# Patient Record
Sex: FEMALE | Race: WHITE | Employment: FULL TIME | URBAN - NONMETROPOLITAN AREA
[De-identification: names, ages, dates, MRNs, and addresses within clinical notes are randomized per-mention and may not be internally consistent; named-entity substitution may affect disease eponyms.]

---

## 2021-09-21 ENCOUNTER — HOSPITAL ENCOUNTER (EMERGENCY)
Age: 41
Discharge: HOME | End: 2021-09-21
Attending: FAMILY MEDICINE

## 2021-09-21 VITALS
OXYGEN SATURATION: 100 % | WEIGHT: 179 LBS | BODY MASS INDEX: 28.09 KG/M2 | TEMPERATURE: 97.8 F | HEART RATE: 79 BPM | RESPIRATION RATE: 18 BRPM | HEIGHT: 67 IN | SYSTOLIC BLOOD PRESSURE: 161 MMHG | DIASTOLIC BLOOD PRESSURE: 78 MMHG

## 2021-09-21 DIAGNOSIS — N30.00 ACUTE CYSTITIS WITHOUT HEMATURIA: Primary | ICD-10-CM

## 2021-09-21 LAB
APPEARANCE UR: CLEAR
BACTERIA URNS QL MICRO: ABNORMAL /HPF
BILIRUB UR QL: NEGATIVE
COLOR UR: ABNORMAL
EPITH CASTS URNS QL MICRO: ABNORMAL /LPF (ref 0–20)
GLUCOSE UR STRIP.AUTO-MCNC: NEGATIVE MG/DL
HGB UR QL STRIP: ABNORMAL
KETONES UR QL STRIP.AUTO: ABNORMAL MG/DL
LEUKOCYTE ESTERASE UR QL STRIP.AUTO: ABNORMAL
NITRITE UR QL STRIP.AUTO: POSITIVE
PH UR STRIP: 6 [PH] (ref 5–8)
PROT UR STRIP-MCNC: NEGATIVE MG/DL
RBC #/AREA URNS HPF: ABNORMAL /HPF (ref 0–2)
SP GR UR REFRACTOMETRY: <1.005 (ref 1–1.03)
UROBILINOGEN UR QL STRIP.AUTO: 1 EU/DL (ref 0.2–1)
WBC URNS QL MICRO: ABNORMAL /HPF (ref 0–4)

## 2021-09-21 PROCEDURE — 81001 URINALYSIS AUTO W/SCOPE: CPT

## 2021-09-21 PROCEDURE — 99283 EMERGENCY DEPT VISIT LOW MDM: CPT

## 2021-09-21 RX ORDER — CEPHALEXIN 500 MG/1
500 CAPSULE ORAL 3 TIMES DAILY
Qty: 21 CAPSULE | Refills: 0 | Status: SHIPPED | OUTPATIENT
Start: 2021-09-21 | End: 2021-09-28

## 2021-09-21 NOTE — LETTER
Ese Benedict was seen and treated in our emergency department on 9/21/2021. She may return to work on 09/22/2021      If you have any questions or concerns, please don't hesitate to call.       Jb Santacruz MD

## 2021-09-21 NOTE — ED PROVIDER NOTES
EMERGENCY DEPARTMENT HISTORY AND PHYSICAL EXAM      Date: 9/21/2021  Patient Name: Beto Crane    History of Presenting Illness     Chief Complaint   Patient presents with    Bladder Infection       History Provided By: Patient    HPI: Beto Crane, 37 y.o. female with a past medical history significant No significant past medical history presents to the ED with cc of urinary symptoms. Patient states she has had urinary frequency and dysuria for the past 4 days. Is gotten worse in the past 24 hours. She is having frequency. She denies any fevers or chills. She denies flank pain. There are no other complaints, changes, or physical findings at this time. PCP: No primary care provider on file. No current facility-administered medications on file prior to encounter. No current outpatient medications on file prior to encounter. Past History     Past Medical History:  History reviewed. No pertinent past medical history. Past Surgical History:  History reviewed. No pertinent surgical history. Family History:  History reviewed. No pertinent family history. Social History:  Social History     Tobacco Use    Smoking status: Never Smoker    Smokeless tobacco: Never Used   Substance Use Topics    Alcohol use: Never    Drug use: Never       Allergies:  No Known Allergies      Review of Systems     Review of Systems   Constitutional: Negative for fatigue and fever. HENT: Negative for rhinorrhea and sore throat. Respiratory: Negative for cough and shortness of breath. Cardiovascular: Negative for chest pain and palpitations. Gastrointestinal: Negative for abdominal pain, diarrhea, nausea and vomiting. Genitourinary: Positive for dysuria, frequency, hematuria and urgency. Negative for difficulty urinating, flank pain and pelvic pain. Musculoskeletal: Negative for arthralgias and myalgias. Skin: Negative for color change and rash.    Neurological: Negative for light-headedness and headaches. Physical Exam     Physical Exam  Vitals and nursing note reviewed. Constitutional:       General: She is awake. She is not in acute distress. Appearance: Normal appearance. She is well-developed and normal weight. She is not ill-appearing, toxic-appearing or diaphoretic. Interventions: Face mask in place. HENT:      Head: Normocephalic and atraumatic. Eyes:      Conjunctiva/sclera: Conjunctivae normal.      Pupils: Pupils are equal, round, and reactive to light. Cardiovascular:      Rate and Rhythm: Normal rate and regular rhythm. Pulses: Normal pulses. Heart sounds: Normal heart sounds. Pulmonary:      Effort: Pulmonary effort is normal.      Breath sounds: Normal breath sounds. Abdominal:      General: Abdomen is flat. Palpations: Abdomen is soft. Tenderness: There is abdominal tenderness. Comments: Suprapubic tenderness     Musculoskeletal:      Cervical back: Normal range of motion and neck supple. Skin:     General: Skin is warm and dry. Neurological:      Mental Status: She is alert and oriented to person, place, and time. GCS: GCS eye subscore is 4. GCS verbal subscore is 5. GCS motor subscore is 6. Psychiatric:         Mood and Affect: Mood and affect normal.         Behavior: Behavior normal. Behavior is cooperative. Thought Content:  Thought content normal.         Lab and Diagnostic Study Results     Labs -     Recent Results (from the past 12 hour(s))   URINALYSIS W/ RFLX MICROSCOPIC    Collection Time: 09/21/21  8:35 AM   Result Value Ref Range    Color Dark Yellow      Appearance Clear      Specific gravity <1.005 (L) 1.005 - 1.030    pH (UA) 6.0 5.0 - 8.0      Protein Negative Negative mg/dL    Glucose Negative Negative mg/dL    Ketone Trace (A) Negative mg/dL    Bilirubin Negative Negative      Blood Moderate (A) Negative      Urobilinogen 1.0 0.2 - 1.0 EU/dL    Nitrites Positive (A) Negative Leukocyte Esterase Moderate (A) Negative     URINE MICROSCOPIC    Collection Time: 09/21/21  8:35 AM   Result Value Ref Range    WBC 10-20 0 - 4 /hpf    RBC 0-5 0 - 2 /hpf    Epithelial cells Few 0 - 20 /lpf    Bacteria 3+ (A) None /hpf       Radiologic Studies -   @lastxrresult@  CT Results  (Last 48 hours)    None        CXR Results  (Last 48 hours)    None            Medical Decision Making   - I am the first provider for this patient. - I reviewed the vital signs, available nursing notes, past medical history, past surgical history, family history and social history. - Initial assessment performed. The patients presenting problems have been discussed, and they are in agreement with the care plan formulated and outlined with them. I have encouraged them to ask questions as they arise throughout their visit. Vital Signs-Reviewed the patient's vital signs. Patient Vitals for the past 12 hrs:   Temp Pulse Resp BP SpO2   09/21/21 0834 97.8 °F (36.6 °C) 79 18 (!) 161/78 100 %       Records Reviewed: Nursing Notes          ED Course:          Provider Notes (Medical Decision Making): MDM       Procedures   Medical Decision Makingedical Decision Making  Performed by: Lacho Donohue MD  PROCEDURES:  Procedures       Disposition   Disposition:     Discharged    DISCHARGE PLAN:  1. Current Discharge Medication List      START taking these medications    Details   cephALEXin (Keflex) 500 mg capsule Take 1 Capsule by mouth three (3) times daily for 7 days. Qty: 21 Capsule, Refills: 0           2. Follow-up Information     Follow up With Specialties Details Why Contact Info    Cass Salvador MD Family Medicine In 1 week As needed [id number] [facility]  [facility]  [phone]          3. Return to ED if worse   4.    Current Discharge Medication List      START taking these medications    Details   cephALEXin (Keflex) 500 mg capsule Take 1 Capsule by mouth three (3) times daily for 7 days.  Qty: 21 Capsule, Refills: 0  Start date: 9/21/2021, End date: 9/28/2021               Diagnosis     Clinical Impression:   1. Acute cystitis without hematuria        Attestations:    Dalton Ibarra MD    Please note that this dictation was completed with iWarda, the computer voice recognition software. Quite often unanticipated grammatical, syntax, homophones, and other interpretive errors are inadvertently transcribed by the computer software. Please disregard these errors. Please excuse any errors that have escaped final proofreading. Thank you.

## 2022-11-22 ENCOUNTER — HOSPITAL ENCOUNTER (EMERGENCY)
Age: 42
Discharge: HOME | End: 2022-11-22
Attending: EMERGENCY MEDICINE

## 2022-11-22 ENCOUNTER — APPOINTMENT (OUTPATIENT)
Dept: CT IMAGING | Age: 42
End: 2022-11-22
Attending: EMERGENCY MEDICINE

## 2022-11-22 VITALS
OXYGEN SATURATION: 98 % | HEIGHT: 67 IN | RESPIRATION RATE: 16 BRPM | TEMPERATURE: 98.2 F | BODY MASS INDEX: 26.68 KG/M2 | DIASTOLIC BLOOD PRESSURE: 81 MMHG | WEIGHT: 170 LBS | HEART RATE: 86 BPM | SYSTOLIC BLOOD PRESSURE: 123 MMHG

## 2022-11-22 DIAGNOSIS — K20.90 ESOPHAGITIS: ICD-10-CM

## 2022-11-22 DIAGNOSIS — R07.9 ACUTE CHEST PAIN: Primary | ICD-10-CM

## 2022-11-22 LAB
ALBUMIN SERPL-MCNC: 4 G/DL (ref 3.4–5)
ALBUMIN/GLOB SERPL: 0.9 {RATIO} (ref 0.8–1.7)
ALP SERPL-CCNC: 60 U/L (ref 45–117)
ALT SERPL-CCNC: 28 U/L (ref 13–56)
ANION GAP SERPL CALC-SCNC: 8 MMOL/L (ref 3–18)
AST SERPL W P-5'-P-CCNC: 9 U/L (ref 10–38)
ATRIAL RATE: 80 BPM
BASOPHILS # BLD: 0 K/UL (ref 0–0.1)
BASOPHILS NFR BLD: 0 % (ref 0–2)
BILIRUB SERPL-MCNC: 0.6 MG/DL (ref 0.2–1)
BUN SERPL-MCNC: 9 MG/DL (ref 7–18)
BUN/CREAT SERPL: 14 (ref 12–20)
CA-I BLD-MCNC: 9.1 MG/DL (ref 8.5–10.1)
CALCULATED P AXIS, ECG09: 31 DEGREES
CALCULATED R AXIS, ECG10: 53 DEGREES
CALCULATED T AXIS, ECG11: 62 DEGREES
CHLORIDE SERPL-SCNC: 104 MMOL/L (ref 100–111)
CO2 SERPL-SCNC: 25 MMOL/L (ref 21–32)
CREAT SERPL-MCNC: 0.65 MG/DL (ref 0.6–1.3)
DIAGNOSIS, 93000: NORMAL
DIFFERENTIAL METHOD BLD: ABNORMAL
EOSINOPHIL # BLD: 0.2 K/UL (ref 0–0.4)
EOSINOPHIL NFR BLD: 3 % (ref 0–5)
ERYTHROCYTE [DISTWIDTH] IN BLOOD BY AUTOMATED COUNT: 12.9 % (ref 11.6–14.5)
GLOBULIN SER CALC-MCNC: 4.4 G/DL (ref 2–4)
GLUCOSE SERPL-MCNC: 122 MG/DL (ref 74–99)
HCT VFR BLD AUTO: 42.7 % (ref 35–45)
HGB BLD-MCNC: 14.3 G/DL (ref 12–16)
IMM GRANULOCYTES # BLD AUTO: 0 K/UL (ref 0–0.04)
IMM GRANULOCYTES NFR BLD AUTO: 0 % (ref 0–0.5)
LIPASE SERPL-CCNC: 102 U/L (ref 73–393)
LYMPHOCYTES # BLD: 1.8 K/UL (ref 0.9–3.6)
LYMPHOCYTES NFR BLD: 21 % (ref 21–52)
MCH RBC QN AUTO: 29.4 PG (ref 24–34)
MCHC RBC AUTO-ENTMCNC: 33.5 G/DL (ref 31–37)
MCV RBC AUTO: 87.7 FL (ref 78–100)
MONOCYTES # BLD: 0.7 K/UL (ref 0.05–1.2)
MONOCYTES NFR BLD: 8 % (ref 3–10)
NEUTS SEG # BLD: 6.1 K/UL (ref 1.8–8)
NEUTS SEG NFR BLD: 68 % (ref 40–73)
NRBC # BLD: 0 K/UL (ref 0–0.01)
NRBC BLD-RTO: 0 PER 100 WBC
P-R INTERVAL, ECG05: 124 MS
PLATELET # BLD AUTO: 317 K/UL (ref 135–420)
PMV BLD AUTO: 9 FL (ref 9.2–11.8)
POTASSIUM SERPL-SCNC: 3.7 MMOL/L (ref 3.5–5.5)
PROT SERPL-MCNC: 8.4 G/DL (ref 6.4–8.2)
Q-T INTERVAL, ECG07: 389 MS
QRS DURATION, ECG06: 76 MS
QTC CALCULATION (BEZET), ECG08: 447 MS
RBC # BLD AUTO: 4.87 M/UL (ref 4.2–5.3)
SODIUM SERPL-SCNC: 137 MMOL/L (ref 136–145)
TROPONIN-HIGH SENSITIVITY: 5 NG/L (ref 0–54)
VENTRICULAR RATE, ECG03: 79 BPM
WBC # BLD AUTO: 8.9 K/UL (ref 4.6–13.2)

## 2022-11-22 PROCEDURE — 71275 CT ANGIOGRAPHY CHEST: CPT

## 2022-11-22 PROCEDURE — 74011000636 HC RX REV CODE- 636: Performed by: EMERGENCY MEDICINE

## 2022-11-22 PROCEDURE — 80053 COMPREHEN METABOLIC PANEL: CPT

## 2022-11-22 PROCEDURE — 83690 ASSAY OF LIPASE: CPT

## 2022-11-22 PROCEDURE — 85025 COMPLETE CBC W/AUTO DIFF WBC: CPT

## 2022-11-22 PROCEDURE — 74011000250 HC RX REV CODE- 250: Performed by: EMERGENCY MEDICINE

## 2022-11-22 PROCEDURE — 99285 EMERGENCY DEPT VISIT HI MDM: CPT

## 2022-11-22 PROCEDURE — 93005 ELECTROCARDIOGRAM TRACING: CPT

## 2022-11-22 PROCEDURE — 84484 ASSAY OF TROPONIN QUANT: CPT

## 2022-11-22 RX ORDER — PANTOPRAZOLE SODIUM 40 MG/1
40 TABLET, DELAYED RELEASE ORAL DAILY
Qty: 14 TABLET | Refills: 0 | Status: SHIPPED | OUTPATIENT
Start: 2022-11-22 | End: 2022-12-06

## 2022-11-22 RX ORDER — SODIUM CHLORIDE 0.9 % (FLUSH) 0.9 %
5-10 SYRINGE (ML) INJECTION
Status: COMPLETED | OUTPATIENT
Start: 2022-11-22 | End: 2022-11-22

## 2022-11-22 RX ADMIN — IOPAMIDOL 99 ML: 755 INJECTION, SOLUTION INTRAVENOUS at 10:59

## 2022-11-22 RX ADMIN — Medication 10 ML: at 10:58

## 2022-11-22 NOTE — ED PROVIDER NOTES
EMERGENCY DEPARTMENT HISTORY AND PHYSICAL EXAM      Date: 11/22/2022  Patient Name: Isiah Mitchell    History of Presenting Illness     Chief Complaint   Patient presents with    Chest Pain       History Provided By: Patient    HPI: Isiah Mitchell, 40 y.o. female  presents to the ED with cc of chest pain. Patient states she has been having symptoms that she thinks is indigestion for about 2 weeks. It has been progressively getting worse. Last night the patient states she had a \"ripping\" chest pain across her entire chest.  Pain radiated to her neck and left arm. She does have some pain in her upper back. Pain also radiates down to the left pelvis. She had some minor shortness of breath but not a large complaint of hers. No nausea or vomiting. No diaphoresis. No bowel symptoms such as diarrhea or constipation. She has been taking Tums and Pepto-Bismol. Stool did turn black after Pepto-Bismol. No other blood seen in the stools. She has a history of diet-controlled diabetes. No other medical history. Past History     Past Medical History:  Past Medical History:   Diagnosis Date    Diabetes [facility])        Past Surgical History:  History reviewed. No pertinent surgical history. Medications:  No current facility-administered medications on file prior to encounter. No current outpatient medications on file prior to encounter. Family History:  History reviewed. No pertinent family history. Social History:  Social History     Tobacco Use    Smoking status: Never    Smokeless tobacco: Never   Substance Use Topics    Alcohol use: Never     Comment: none    Drug use: Never       Allergies:  No Known Allergies    All the above components of the past  history are auto-populated from the electronic record. They have been reviewed and the patient has been interviewed for any pertinent past history that pertains to the patient's chief complaint and reason for visit.   Not all pre-populated components may be accurate at the time this note was generated. Review of Systems   Review of Systems   Constitutional:  Negative for chills and fever. HENT:  Negative for congestion, ear pain, rhinorrhea, sore throat and trouble swallowing. Eyes:  Negative for visual disturbance. Respiratory:  Positive for shortness of breath. Negative for cough and chest tightness. Cardiovascular:  Positive for chest pain. Negative for palpitations. Gastrointestinal:  Negative for abdominal pain, blood in stool, constipation, diarrhea, nausea and vomiting. Genitourinary:  Negative for decreased urine volume, difficulty urinating, dysuria and frequency. Musculoskeletal:  Positive for back pain. Negative for neck pain. Skin:  Negative for color change and rash. Neurological:  Negative for dizziness, weakness, light-headedness and headaches. Physical Exam   Physical Exam  Vitals and nursing note reviewed. Constitutional:       General: She is not in acute distress. Appearance: She is well-developed. She is not ill-appearing. HENT:      Head: Normocephalic. Eyes:      Conjunctiva/sclera: Conjunctivae normal.   Cardiovascular:      Rate and Rhythm: Normal rate and regular rhythm. Pulmonary:      Effort: Pulmonary effort is normal. No accessory muscle usage or respiratory distress. Abdominal:      General: There is no distension. Musculoskeletal:      Cervical back: Normal range of motion. Skin:     General: Skin is warm and dry. Neurological:      Mental Status: She is alert and oriented to person, place, and time.        Diagnostic Study Results     Labs -     Recent Results (from the past 24 hour(s))   EKG, 12 LEAD, INITIAL    Collection Time: 11/22/22  9:39 AM   Result Value Ref Range    Ventricular Rate 79 BPM    Atrial Rate 80 BPM    P-R Interval 124 ms    QRS Duration 76 ms    Q-T Interval 389 ms    QTC Calculation (Bezet) 447 ms    Calculated P Axis 31 degrees    Calculated R Axis 53 degrees    Calculated T Axis 62 degrees    Diagnosis Sinus rhythm    CBC WITH AUTOMATED DIFF    Collection Time: 11/22/22  9:40 AM   Result Value Ref Range    WBC 8.9 4.6 - 13.2 K/uL    RBC 4.87 4.20 - 5.30 M/uL    HGB 14.3 12.0 - 16.0 g/dL    HCT 42.7 35.0 - 45.0 %    MCV 87.7 78.0 - 100.0 FL    MCH 29.4 24.0 - 34.0 PG    MCHC 33.5 31.0 - 37.0 g/dL    RDW 12.9 11.6 - 14.5 %    PLATELET 537 437 - 282 K/uL    MPV 9.0 (L) 9.2 - 11.8 FL    NRBC 0.0 0.0  WBC    ABSOLUTE NRBC 0.00 0.00 - 0.01 K/uL    NEUTROPHILS 68 40 - 73 %    LYMPHOCYTES 21 21 - 52 %    MONOCYTES 8 3 - 10 %    EOSINOPHILS 3 0 - 5 %    BASOPHILS 0 0 - 2 %    IMMATURE GRANULOCYTES 0 0 - 0.5 %    ABS. NEUTROPHILS 6.1 1.8 - 8.0 K/UL    ABS. LYMPHOCYTES 1.8 0.9 - 3.6 K/UL    ABS. MONOCYTES 0.7 0.05 - 1.2 K/UL    ABS. EOSINOPHILS 0.2 0.0 - 0.4 K/UL    ABS. BASOPHILS 0.0 0.0 - 0.1 K/UL    ABS. IMM. GRANS. 0.0 0.00 - 0.04 K/UL    DF AUTOMATED     METABOLIC PANEL, COMPREHENSIVE    Collection Time: 11/22/22  9:40 AM   Result Value Ref Range    Sodium 137 136 - 145 mmol/L    Potassium 3.7 3.5 - 5.5 mmol/L    Chloride 104 100 - 111 mmol/L    CO2 25 21 - 32 mmol/L    Anion gap 8 3.0 - 18.0 mmol/L    Glucose 122 (H) 74 - 99 mg/dL    BUN 9 7 - 18 mg/dL    Creatinine 0.65 0.60 - 1.30 mg/dL    BUN/Creatinine ratio 14 12 - 20      eGFR >60 >60 ml/min/1.73m2    Calcium 9.1 8.5 - 10.1 mg/dL    Bilirubin, total 0.6 0.2 - 1.0 mg/dL    AST (SGOT) 9 (L) 10 - 38 U/L    ALT (SGPT) 28 13 - 56 U/L    Alk. phosphatase 60 45 - 117 U/L    Protein, total 8.4 (H) 6.4 - 8.2 g/dL    Albumin 4.0 3.4 - 5.0 g/dL    Globulin 4.4 (H) 2.0 - 4.0 g/dL    A-G Ratio 0.9 0.8 - 1.7     TROPONIN-HIGH SENSITIVITY    Collection Time: 11/22/22  9:40 AM   Result Value Ref Range    Troponin-High Sensitivity 5 0 - 54 ng/L   LIPASE    Collection Time: 11/22/22  9:40 AM   Result Value Ref Range    Lipase 102 73 - 393 U/L       Radiologic Studies -   CTA CHEST W OR W WO CONT   Final Result      1.   No evidence of pulmonary embolism or other acute intrathoracic abnormality. CT Results  (Last 48 hours)                 11/22/22 1106  CTA CHEST W OR W WO CONT Final result    Impression:      1. No evidence of pulmonary embolism or other acute intrathoracic abnormality. Narrative:  EXAM: CTA Chest       INDICATION: Chest pain, extending into the back and left arm       COMPARISON: No prior study. TECHNIQUE: Axial CT imaging from the thoracic inlet through the diaphragm with   intravenous contrast utilizing CTA study for pulmonary artery evaluation. Coronal and sagittal MIP reformations were generated at a separate workstation. One or more dose reduction techniques were used on this CT: automated exposure   control, adjustment of the mAs and/or kVp according to patient size, and   iterative reconstruction techniques. The specific techniques used on this CT   exam have been documented in the patient's electronic medical record. Digital   Imaging and Communications in Medicine (DICOM) format image data are available   to nonaffiliated external healthcare facilities or entities on a secure, media   free, reciprocally searchable basis with patient authorization for at least a   12-month period after this study. _______________       FINDINGS:       EXAM QUALITY: Overall exam quality is adequate. Pulmonary arterial enhancement   is adequate. The breath hold is satisfactory. PULMONARY ARTERIES: No convincing evidence of pulmonary embolism. MEDIASTINUM: Normal cardiac size. Thoracic aorta is normal in course and caliber   without evidence of aneurysm or dissection. Normal cardiac size. No pericardial   effusion. LYMPH NODES: No enlarged mediastinal or hilar nodes by size criteria. AIRWAY: Unremarkable. LUNGS: No suspicious nodule or mass. No consolidation. PLEURA: No pleural effusion or pneumothorax. UPPER ABDOMEN: Visualized upper abdomen is unremarkable. Algis Broaden OTHER: No acute or aggressive osseous abnormalities identified. _______________                 CXR Results  (Last 48 hours)      None              Medical Decision Making     I reviewed the vital signs, available nursing notes, past medical history, past surgical history, family history and social history. Vital Signs-I have reviewed the vital signs that have been made available during the patient's emergency department visit. The vital signs auto-populated below are obtained mostly by electronic means through monitoring devices that have been downloaded into the patient's chart by the nursing staff. Some vital signs are not downloaded into the chart until after the patient has been discharged and this note has been completed, therefore some vital signs may not be available to the physician for review prior to patient's discharge or admission. The physician has reviewed the patient's triage vital signs, monitored the electronic monitoring devices remotely for any significant vital sign abnormalities, and have reviewed vital signs prior to discharge. Some vital signs reviewed at bedside or remotely utilizing electronic monitoring devices may be different than the vital signs downloaded into the electronic medical record. Some vital signs may be erroneous and inaccurate since they are obtained by electronic monitoring devices, and not all vital signs are verified for accuracy by nursing staff prior to downloading into the patient's chart. Patient Vitals for the past 24 hrs:   Temp Pulse Resp BP SpO2   11/22/22 0943 98.2 °F (36.8 °C) 75 -- -- --   11/22/22 0937 -- -- 14 (!) 157/94 100 %         Records Reviewed: Nursing notes for today's visit have been reviewed. I have also reviewed most recent medical records pertinent to today's complaints, if available in our medical record system.   I have also reviewed all labs and imaging results from previous results in comparison to results obtained today.  If an EKG was obtained today, it has been compared to previous EKGs, if available. If arriving via EMS, the EMS report has been reviewed if made available to us within the patient's time in the emergency department. ED Course and Medical Decision Making:       MDM  Number of Diagnoses or Management Options  Acute chest pain  Esophagitis  Diagnosis management comments: Presents with chest pain that has been present for about 2 weeks but got acutely worse overnight. She did describe it as a ripping sensation. She has little risk factors for ACS. EKG is nonischemic. A high-sensitivity troponin obtained greater than 3 hours after onset of pain was negative. I do not suspect this to be a cardiac etiology. Her description of the pain as a ripping sensation and having pain in her back, below the diaphragm, and in neck and arm makes me suspicious for aortic dissection. CTA imaging was negative for this. I do not suspect PE, however CTA imaging was also negative for this. I suspect she may have GERD with esophagitis. Will refer to GI. We will start her on a PPI. She may also use famotidine or Maalox as needed.        Amount and/or Complexity of Data Reviewed  Clinical lab tests: ordered and reviewed  Tests in the radiology section of CPT®: ordered and reviewed  Review and summarize past medical records: yes  Independent visualization of images, tracings, or specimens: yes    Risk of Complications, Morbidity, and/or Mortality  Presenting problems: moderate  Diagnostic procedures: moderate  Management options: low    Patient Progress  Patient progress: stable           Orders Placed This Encounter    CTA CHEST W OR W WO CONT    CBC WITH AUTOMATED DIFF    COMPREHENSIVE METABOLIC PANEL    TROPONIN-HIGH SENSITIVITY    LIPASE    EKG NOTEWRITER(ASAP ONLY)    EKG, 12 LEAD, INITIAL    SALINE LOCK IV ONE TIME STAT    iopamidoL (ISOVUE-370) 76 % injection  mL    sodium chloride (NS) flush 5-10 mL pantoprazole (Protonix) 40 mg tablet       EKG    Date/Time: 11/22/2022 9:39 AM  Performed by: Mackenzie Mckeon MD  Authorized by: Mackenzie Mckeon MD     ECG reviewed by ED Physician in the absence of a cardiologist: yes    Rate:     ECG rate:  79    ECG rate assessment: normal    Rhythm:     Rhythm: sinus rhythm    Ectopy:     Ectopy: none    QRS:     QRS axis:  Normal    QRS intervals:  Normal    QRS conduction: normal    ST segments:     ST segments:  Normal  T waves:     T waves: normal        Critical Care Time:   0    Disposition:  Discharge    The patient's emergency department evaluation is now complete. I have reviewed all labs, imaging, and pertinent information. I have discussed all results with the patient and/or family. Based on our evaluation today I do believe that the patient is safe to be discharged home. The patient has been provided with at home instructions that are pertinent to their complaint today, although these may not be specific to the exact diagnosis. I have reviewed the patient's home medications and attempted to reconcile if not already done so by pharmacy or nursing staff. I have discussed all new prescriptions with the patient. The patient has been encouraged to follow-up with primary care doctor and/or specialist, and these have been discussed with the patient. The patient has been advised that they may return to the emergency department if they have any worsening symptoms and or new symptoms that are of concern to them. Verbal discharge instructions may have also been provided to the patient that may not be specifically contained in the written discharge instructions. The patient has been given opportunity to ask questions prior to discharge. PLAN:  1. Current Discharge Medication List        START taking these medications    Details   pantoprazole (Protonix) 40 mg tablet Take 1 Tablet by mouth daily for 14 days.   Qty: 14 Tablet, Refills: 0  Start date: 11/22/2022, End date: 12/6/2022           2. Follow-up Information       Follow up With Specialties Details Why Contact Info    Gena Reyes MD Gastroenterology Schedule an appointment as soon as possible for a visit   [address] [facility]  [phone]            Return to ED if worse     Diagnosis     Clinical Impression:   1. Acute chest pain    2.  Esophagitis

## 2022-11-22 NOTE — DISCHARGE INSTRUCTIONS
It was a pleasure taking care of you in our Emergency Department today. We know that when you come to [facility], you are entrusting us with your health, comfort, and safety. Our physicians and nurses honor that trust, and truly appreciate the opportunity to care for you and your loved ones. We also value your feedback. If you receive a survey about your Emergency Department experience today, please fill it out. We care about our patients' feedback, and we listen to what you have to say. Please read over your discharge instructions as these contain pertinent information to help you in the healing process. These instructions include a list of prescriptions you were given today. Follow-up information is also noted on your discharge papers. There are attached instructions and information pertaining to the reason why you were seen in the emergency department today. These discharge instructions may not be for exactly why you were here, but may be the closest available instructions that we have. These include important advice for things that you can do at home to feel better, and reasons to return to the emergency department. The evaluation and treatment you received in the emergency department is not always definitive care. If follow-up with your primary care doctor or specialist was recommended, it is important that you make these appointments for follow-up care. You may need further testing, procedures, and/or medications to help you feel better. Further tests may be required that are not available in the emergency department. Failure to make these follow-up appointments may jeopardize your health. The emergency department is here for emergent stabilization and evaluation of life and limb threatening illness and/or injuries. Further care through a specialist or primary care doctor may be required to assist in your healing and complete your treatment and/or evaluation.   We may not always be able to make a diagnosis in the emergency department, or things may change that will alter your diagnosis. Our primary goal is to ensure that nothing serious is occurring and that you are stable to continue your treatment and evaluation at home as an outpatient. Of course, if things change, and you feel worse, you are always encouraged to return to the emergency department for re-evaluation. Lab Results Today:  Recent Results (from the past 8 hour(s))   EKG, 12 LEAD, INITIAL    Collection Time: 11/22/22  9:39 AM   Result Value Ref Range    Ventricular Rate 79 BPM    Atrial Rate 80 BPM    P-R Interval 124 ms    QRS Duration 76 ms    Q-T Interval 389 ms    QTC Calculation (Bezet) 447 ms    Calculated P Axis 31 degrees    Calculated R Axis 53 degrees    Calculated T Axis 62 degrees    Diagnosis Sinus rhythm    CBC WITH AUTOMATED DIFF    Collection Time: 11/22/22  9:40 AM   Result Value Ref Range    WBC 8.9 4.6 - 13.2 K/uL    RBC 4.87 4.20 - 5.30 M/uL    HGB 14.3 12.0 - 16.0 g/dL    HCT 42.7 35.0 - 45.0 %    MCV 87.7 78.0 - 100.0 FL    MCH 29.4 24.0 - 34.0 PG    MCHC 33.5 31.0 - 37.0 g/dL    RDW 12.9 11.6 - 14.5 %    PLATELET 569 998 - 174 K/uL    MPV 9.0 (L) 9.2 - 11.8 FL    NRBC 0.0 0.0  WBC    ABSOLUTE NRBC 0.00 0.00 - 0.01 K/uL    NEUTROPHILS 68 40 - 73 %    LYMPHOCYTES 21 21 - 52 %    MONOCYTES 8 3 - 10 %    EOSINOPHILS 3 0 - 5 %    BASOPHILS 0 0 - 2 %    IMMATURE GRANULOCYTES 0 0 - 0.5 %    ABS. NEUTROPHILS 6.1 1.8 - 8.0 K/UL    ABS. LYMPHOCYTES 1.8 0.9 - 3.6 K/UL    ABS. MONOCYTES 0.7 0.05 - 1.2 K/UL    ABS. EOSINOPHILS 0.2 0.0 - 0.4 K/UL    ABS. BASOPHILS 0.0 0.0 - 0.1 K/UL    ABS. IMM.  GRANS. 0.0 0.00 - 0.04 K/UL    DF AUTOMATED     METABOLIC PANEL, COMPREHENSIVE    Collection Time: 11/22/22  9:40 AM   Result Value Ref Range    Sodium 137 136 - 145 mmol/L    Potassium 3.7 3.5 - 5.5 mmol/L    Chloride 104 100 - 111 mmol/L    CO2 25 21 - 32 mmol/L    Anion gap 8 3.0 - 18.0 mmol/L    Glucose 122 (H) 74 - 99 mg/dL    BUN 9 7 - 18 mg/dL    Creatinine 0.65 0.60 - 1.30 mg/dL    BUN/Creatinine ratio 14 12 - 20      eGFR >60 >60 ml/min/1.73m2    Calcium 9.1 8.5 - 10.1 mg/dL    Bilirubin, total 0.6 0.2 - 1.0 mg/dL    AST (SGOT) 9 (L) 10 - 38 U/L    ALT (SGPT) 28 13 - 56 U/L    Alk. phosphatase 60 45 - 117 U/L    Protein, total 8.4 (H) 6.4 - 8.2 g/dL    Albumin 4.0 3.4 - 5.0 g/dL    Globulin 4.4 (H) 2.0 - 4.0 g/dL    A-G Ratio 0.9 0.8 - 1.7     TROPONIN-HIGH SENSITIVITY    Collection Time: 11/22/22  9:40 AM   Result Value Ref Range    Troponin-High Sensitivity 5 0 - 54 ng/L   LIPASE    Collection Time: 11/22/22  9:40 AM   Result Value Ref Range    Lipase 102 73 - 393 U/L        Radiology Results Today:  CTA CHEST W OR W WO CONT    Result Date: 11/22/2022  1. No evidence of pulmonary embolism or other acute intrathoracic abnormality.

## 2022-12-06 NOTE — PROGRESS NOTES
Numbers listed for patient are not working numbers both home and cell. Unable to reach patient for scheduling.